# Patient Record
Sex: FEMALE
[De-identification: names, ages, dates, MRNs, and addresses within clinical notes are randomized per-mention and may not be internally consistent; named-entity substitution may affect disease eponyms.]

---

## 2023-03-04 ENCOUNTER — NURSE TRIAGE (OUTPATIENT)
Dept: OTHER | Facility: CLINIC | Age: 53
End: 2023-03-04

## 2023-03-04 NOTE — TELEPHONE ENCOUNTER
Location of patient: [place]    Subjective: Caller states \" That she has had a recent hx of hand swelling. States that she was given steroids and now tauridol,  since the prednisone was  giving  her heart  burn. Today around 12 pm  patient states that the swelling has returned, but has now extended to her elbow. Patient states that her hand is extremely painful. She explains that she is able to move it but not having great function. She  has taken her medication as prescribed and has had no relief. \"     Current Symptoms: hand swelling    Onset: 1 day ago; worsening    Associated Symptoms: reduced activity, pain, swelling moving up the arm    Pain Severity: 7/10; aching; constant    Temperature: denies fever at this time. by unknown method    What has been tried: pain medication and elevation    Recommended disposition: See HCP within 4 Hours (or PCP triage)    Care advice provided, patient verbalizes understanding; denies any other questions or concerns; instructed to call back for any new or worsening symptoms.    Patient/caller agrees to proceed to nearest Emergency Department    This triage is a result of a call to [facility] Nurse Line. Please do not respond to the triage nurse through this encounter. Any subsequent communication should be directly with the patient.    Reason for Disposition   SEVERE hand swelling (e.g., swelling of entire hand and up into forearm)    Protocols used: Hand Swelling-ADULT-